# Patient Record
Sex: MALE | Race: WHITE | NOT HISPANIC OR LATINO | URBAN - METROPOLITAN AREA
[De-identification: names, ages, dates, MRNs, and addresses within clinical notes are randomized per-mention and may not be internally consistent; named-entity substitution may affect disease eponyms.]

---

## 2020-07-25 ENCOUNTER — TELEPHONE ENCOUNTER (OUTPATIENT)
Dept: URBAN - METROPOLITAN AREA CLINIC 13 | Facility: CLINIC | Age: 80
End: 2020-07-25

## 2020-07-25 RX ORDER — POLYETHYLENE GLYCOL 3350, SODIUM CHLORIDE, SODIUM BICARBONATE AND POTASSIUM CHLORIDE WITH LEMON FLAVOR 420; 11.2; 5.72; 1.48 G/4L; G/4L; G/4L; G/4L
TAKE AS DIRECTED POWDER, FOR SOLUTION ORAL
Qty: 1 | Refills: 0 | OUTPATIENT
Start: 2016-03-02 | End: 2016-04-27

## 2020-07-26 ENCOUNTER — TELEPHONE ENCOUNTER (OUTPATIENT)
Dept: URBAN - METROPOLITAN AREA CLINIC 13 | Facility: CLINIC | Age: 80
End: 2020-07-26

## 2022-03-04 ENCOUNTER — TELEPHONE ENCOUNTER (OUTPATIENT)
Dept: URBAN - METROPOLITAN AREA CLINIC 72 | Facility: CLINIC | Age: 82
End: 2022-03-04

## 2022-10-26 ENCOUNTER — OFFICE VISIT (OUTPATIENT)
Dept: URBAN - METROPOLITAN AREA CLINIC 113 | Facility: CLINIC | Age: 82
End: 2022-10-26
Payer: MEDICARE

## 2022-10-26 VITALS
DIASTOLIC BLOOD PRESSURE: 75 MMHG | RESPIRATION RATE: 20 BRPM | HEART RATE: 67 BPM | WEIGHT: 154 LBS | HEIGHT: 71 IN | TEMPERATURE: 98.4 F | BODY MASS INDEX: 21.56 KG/M2 | SYSTOLIC BLOOD PRESSURE: 151 MMHG

## 2022-10-26 DIAGNOSIS — Z86.010 HISTORY OF ADENOMATOUS POLYP OF COLON: ICD-10-CM

## 2022-10-26 DIAGNOSIS — R93.2 ABNORMAL MRI, LIVER: ICD-10-CM

## 2022-10-26 PROCEDURE — 99203 OFFICE O/P NEW LOW 30 MIN: CPT | Performed by: INTERNAL MEDICINE

## 2022-10-26 RX ORDER — AZELASTINE HYDROCHLORIDE 137 UG/1
1 PUFF IN EACH NOSTRIL SPRAY, METERED NASAL TWICE A DAY
Status: ACTIVE | COMMUNITY

## 2022-10-26 RX ORDER — ASPIRIN 81 MG/1
1 TABLET TABLET, COATED ORAL ONCE A DAY
Status: ACTIVE | COMMUNITY

## 2022-10-26 RX ORDER — OXYBUTYNIN CHLORIDE 5 MG/1
1 TABLET TABLET ORAL TWICE A DAY
Status: ACTIVE | COMMUNITY

## 2022-10-26 RX ORDER — CETIRIZINE HYDROCHLORIDE 10 MG/1
1 TABLET TABLET, FILM COATED ORAL ONCE A DAY
Status: ACTIVE | COMMUNITY

## 2022-10-26 RX ORDER — CARBIDOPA AND LEVODOPA 25; 100 MG/1; MG/1
2 TABLETS TABLET ORAL THREE TIMES A DAY
Status: ACTIVE | COMMUNITY

## 2022-10-26 RX ORDER — ASPIRIN 81 MG
1 TABLET AS NEEDED TABLET, DELAYED RELEASE (ENTERIC COATED) ORAL TWICE DAILY
Status: ACTIVE | COMMUNITY

## 2022-10-26 RX ORDER — HYDROXYZINE PAMOATE 100 MG/1
1 CAPSULE AT BEDTIME AS NEEDED CAPSULE ORAL ONCE A DAY
Status: ACTIVE | COMMUNITY

## 2022-10-26 NOTE — HPI-TODAY'S VISIT:
Kylah Higuera is an 82-year-old male referred by Dr. Zamudio for follow-up of a liver hemangioma and consideration of screening colonoscopy.  He was last seen here in 2016 at the time of a prior colonoscopy, which was performed by Dr. Berg.  That examination was notable for a 10 mm transverse colon polyp which was a tubular adenoma.  Internal hemorrhoids were also noted.  A prior colonoscopy by Dr. Berg in 2006 showed only internal hemorrhoids.  A 5-year follow-up examination was recommended.  He has a history of a hepatic hemangioma, most recently evaluated by MRI in December 2021.  He was referred to Dr. Norman Varela at that time, but declined evaluation. The 12/29/2021 MRI revealed 2 distinct hepatic lesions in the dome of the liver measuring 2.6 x 1.9 cm and 1.1 x 0.9 cm, respectively.  These lesions had no central scar present, without to represent either an atypical hemangioma versus other lesions, with metastatic disease being in the differential diagnosis.  Notably, in April 2022, his labs are notable for normal CBC and a normal complete metabolic panel, including an alkaline phosphatase of 66, AST 22, ALT of 4, and albumin of 4.1.  He has no known history of liver disease.     He has no GI complaints.  His only complaint currently is of pain in his foot and ankle and swelling in the right greater than left lower extremity which comes and goes.  He denies any bright red rectal bleeding, melena, abdominal pain, diarrhea, constipation, etc.

## 2022-10-27 LAB
A/G RATIO: 1.4
ABSOLUTE BASOPHILS: 39
ABSOLUTE EOSINOPHILS: 280
ABSOLUTE LYMPHOCYTES: 1450
ABSOLUTE MONOCYTES: 481
ABSOLUTE NEUTROPHILS: 4251
ALBUMIN: 3.7
ALKALINE PHOSPHATASE: 63
ALT (SGPT): 16
AST (SGOT): 15
BASOPHILS: 0.6
BILIRUBIN, TOTAL: 0.5
BUN/CREATININE RATIO: (no result)
BUN: 17
CALCIUM: 9.1
CARBON DIOXIDE, TOTAL: 27
CHLORIDE: 103
CREATININE: 1.05
EGFR: 71
EOSINOPHILS: 4.3
GLOBULIN, TOTAL: 2.7
GLUCOSE: 81
HBSAG SCREEN: (no result)
HEMATOCRIT: 40.1
HEMOGLOBIN: 13.2
HEP A AB, IGM: (no result)
HEP B CORE AB, IGM: (no result)
HEP C VIRUS AB: 0.07
HEPATITIS C ANTIBODY: (no result)
LYMPHOCYTES: 22.3
MCH: 30.6
MCHC: 32.9
MCV: 93
MONOCYTES: 7.4
MPV: 11
NEUTROPHILS: 65.4
PLATELET COUNT: 228
POTASSIUM: 4.2
PROTEIN, TOTAL: 6.4
RDW: 12.1
RED BLOOD CELL COUNT: 4.31
SODIUM: 137
WHITE BLOOD CELL COUNT: 6.5

## 2022-10-31 ENCOUNTER — DASHBOARD ENCOUNTERS (OUTPATIENT)
Age: 82
End: 2022-10-31

## 2022-10-31 ENCOUNTER — LAB OUTSIDE AN ENCOUNTER (OUTPATIENT)
Dept: URBAN - METROPOLITAN AREA CLINIC 113 | Facility: CLINIC | Age: 82
End: 2022-10-31

## 2022-11-01 ENCOUNTER — LAB OUTSIDE AN ENCOUNTER (OUTPATIENT)
Dept: URBAN - METROPOLITAN AREA CLINIC 113 | Facility: CLINIC | Age: 82
End: 2022-11-01

## 2022-11-02 LAB
A/G RATIO: 1.5
ABSOLUTE BASOPHILS: 39
ABSOLUTE EOSINOPHILS: 296
ABSOLUTE LYMPHOCYTES: 1186
ABSOLUTE MONOCYTES: 538
ABSOLUTE NEUTROPHILS: 5741
AFP, SERUM, TUMOR MARKER: 4.6
ALBUMIN: 3.8
ALKALINE PHOSPHATASE: 63
ALT (SGPT): 17
AST (SGOT): 15
BASOPHILS: 0.5
BILIRUBIN, TOTAL: 0.5
BUN/CREATININE RATIO: (no result)
BUN: 18
CA 19-9: 19
CALCIUM: 9.1
CARBON DIOXIDE, TOTAL: 26
CEA: 1.8
CHLORIDE: 105
CREATININE: 1.17
EGFR: 63
EOSINOPHILS: 3.8
FERRITIN, SERUM: 37
GLOBULIN, TOTAL: 2.6
GLUCOSE: 74
HBSAG SCREEN: (no result)
HEMATOCRIT: 38.5
HEMOGLOBIN: 12.9
HEP A AB, IGM: (no result)
HEP B CORE AB, IGM: (no result)
HEP C VIRUS AB: 0.08
HEPATITIS C ANTIBODY: (no result)
IRON BIND.CAP.(TIBC): 330
IRON SATURATION: 19
IRON: 64
LYMPHOCYTES: 15.2
MCH: 30.5
MCHC: 33.5
MCV: 91
MONOCYTES: 6.9
MPV: 10.8
NEUTROPHILS: 73.6
PLATELET COUNT: 226
POTASSIUM: 4.1
PROTEIN, TOTAL: 6.4
RDW: 12.1
RED BLOOD CELL COUNT: 4.23
SODIUM: 141
WHITE BLOOD CELL COUNT: 7.8

## 2022-11-14 ENCOUNTER — TELEPHONE ENCOUNTER (OUTPATIENT)
Dept: URBAN - METROPOLITAN AREA CLINIC 113 | Facility: CLINIC | Age: 82
End: 2022-11-14

## 2022-12-06 ENCOUNTER — TELEPHONE ENCOUNTER (OUTPATIENT)
Dept: URBAN - METROPOLITAN AREA CLINIC 113 | Facility: CLINIC | Age: 82
End: 2022-12-06

## 2022-12-13 PROBLEM — 429047008: Status: ACTIVE | Noted: 2022-10-31

## 2022-12-15 ENCOUNTER — TELEPHONE ENCOUNTER (OUTPATIENT)
Dept: URBAN - METROPOLITAN AREA CLINIC 113 | Facility: CLINIC | Age: 82
End: 2022-12-15

## 2022-12-19 ENCOUNTER — OFFICE VISIT (OUTPATIENT)
Dept: URBAN - METROPOLITAN AREA MEDICAL CENTER 2 | Facility: MEDICAL CENTER | Age: 82
End: 2022-12-19
Payer: MEDICARE

## 2022-12-19 DIAGNOSIS — D12.2 ADENOMA OF ASCENDING COLON: ICD-10-CM

## 2022-12-19 DIAGNOSIS — Z86.010 ADENOMAS PERSONAL HISTORY OF COLONIC POLYPS: ICD-10-CM

## 2022-12-19 PROCEDURE — 45385 COLONOSCOPY W/LESION REMOVAL: CPT | Performed by: INTERNAL MEDICINE

## 2024-09-16 ENCOUNTER — CLAIMS CREATED FROM THE CLAIM WINDOW (OUTPATIENT)
Dept: URBAN - METROPOLITAN AREA MEDICAL CENTER 19 | Facility: MEDICAL CENTER | Age: 84
End: 2024-09-16
Payer: MEDICARE

## 2024-09-16 DIAGNOSIS — K20.80 ABSCESS OF ESOPHAGUS: ICD-10-CM

## 2024-09-16 DIAGNOSIS — K22.2 ACQUIRED ESOPHAGEAL RING: ICD-10-CM

## 2024-09-16 DIAGNOSIS — T18.128A FOOD IMPACTION OF ESOPHAGUS: ICD-10-CM

## 2024-09-16 PROCEDURE — 99222 1ST HOSP IP/OBS MODERATE 55: CPT | Performed by: INTERNAL MEDICINE

## 2024-09-16 PROCEDURE — 43247 EGD REMOVE FOREIGN BODY: CPT | Performed by: INTERNAL MEDICINE

## 2024-09-18 ENCOUNTER — CLAIMS CREATED FROM THE CLAIM WINDOW (OUTPATIENT)
Dept: URBAN - METROPOLITAN AREA MEDICAL CENTER 19 | Facility: MEDICAL CENTER | Age: 84
End: 2024-09-18
Payer: MEDICARE

## 2024-09-18 DIAGNOSIS — K59.09 OTHER CONSTIPATION: ICD-10-CM

## 2024-09-18 DIAGNOSIS — K44.9 DIAPHRAGMATIC HERNIA WITHOUT OBSTRUCTION OR GANGRENE: ICD-10-CM

## 2024-09-18 DIAGNOSIS — K22.2 ESOPHAGEAL OBSTRUCTION: ICD-10-CM

## 2024-09-18 DIAGNOSIS — T18.128A FOOD IMPACTION OF ESOPHAGUS: ICD-10-CM

## 2024-09-18 PROCEDURE — 99232 SBSQ HOSP IP/OBS MODERATE 35: CPT | Performed by: INTERNAL MEDICINE

## 2024-09-19 ENCOUNTER — TELEPHONE ENCOUNTER (OUTPATIENT)
Dept: URBAN - METROPOLITAN AREA CLINIC 113 | Facility: CLINIC | Age: 84
End: 2024-09-19

## 2024-12-10 ENCOUNTER — OFFICE VISIT (OUTPATIENT)
Dept: URBAN - METROPOLITAN AREA CLINIC 113 | Facility: CLINIC | Age: 84
End: 2024-12-10